# Patient Record
Sex: FEMALE | Race: WHITE | Employment: UNEMPLOYED | URBAN - METROPOLITAN AREA
[De-identification: names, ages, dates, MRNs, and addresses within clinical notes are randomized per-mention and may not be internally consistent; named-entity substitution may affect disease eponyms.]

---

## 2020-12-02 ENCOUNTER — OFFICE VISIT (OUTPATIENT)
Dept: INTERNAL MEDICINE CLINIC | Age: 20
End: 2020-12-02
Payer: COMMERCIAL

## 2020-12-02 VITALS
RESPIRATION RATE: 16 BRPM | WEIGHT: 195 LBS | HEART RATE: 80 BPM | BODY MASS INDEX: 38.28 KG/M2 | TEMPERATURE: 97.9 F | SYSTOLIC BLOOD PRESSURE: 106 MMHG | HEIGHT: 60 IN | OXYGEN SATURATION: 99 % | DIASTOLIC BLOOD PRESSURE: 66 MMHG

## 2020-12-02 DIAGNOSIS — F32.A ANXIETY AND DEPRESSION: ICD-10-CM

## 2020-12-02 DIAGNOSIS — F41.9 ANXIETY AND DEPRESSION: ICD-10-CM

## 2020-12-02 DIAGNOSIS — Z13.220 SCREENING CHOLESTEROL LEVEL: ICD-10-CM

## 2020-12-02 DIAGNOSIS — Z00.00 ANNUAL PHYSICAL EXAM: Primary | ICD-10-CM

## 2020-12-02 DIAGNOSIS — R63.5 WEIGHT GAIN: ICD-10-CM

## 2020-12-02 DIAGNOSIS — E66.01 SEVERE OBESITY (HCC): ICD-10-CM

## 2020-12-02 PROCEDURE — 99204 OFFICE O/P NEW MOD 45 MIN: CPT | Performed by: INTERNAL MEDICINE

## 2020-12-02 RX ORDER — CITALOPRAM 10 MG/1
TABLET ORAL
Qty: 44 TAB | Refills: 0 | Status: SHIPPED | OUTPATIENT
Start: 2020-12-02 | End: 2021-01-05

## 2020-12-02 NOTE — PROGRESS NOTES
1. Severe obesity ([address]  the patient I had approximately 10-minute conversation regarding her weight. She admits she has gained quite a bit of weight ever since she had her child. 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 She notes that she does only eat a meal at night but she admits to huge meal with a lot of carbs. I have recommended the ketogenic diet can and I have explained to her her goal should be no more than 50 g of carbs per day. I also explained the significance of carbs from fiber and carbs from processed sugar. She is also going to look this up so she can get more information as well. 2. Annual physical exam  normal physical exam except for obesity  - TSH 3RD GENERATION; Future  - LIPID PANEL; Future    3. Anxiety and depression  we a long discussion regarding her anxiety and intermittent depression. This is been going on for a while and at first she thought it was ADHD but given the stressors at home and her problems with sleeping I suspect much of this is underlying depression anxiety. She has no suicidal ideation or homicidal ideation and interestingly enough reports that 2 family members are on Celexa. We will go ahead and start Celexa and have her follow-up in 4 weeks we will do 10 mg daily for 14 days and then increase to 2 tabs daily. She will then f/u in 4 weeks to gauge response.  - citalopram (CELEXA) 10 mg tablet; 1 tab daily for 14 days then increase to 2 tabs daily  Dispense: 44 Tab; Refill: 0    4. Weight gain  a little bit of thinning hair was noted. We will check a TSH  - TSH 3RD GENERATION; Future    5. Screening cholesterol level  echo screening lipid panel  - LIPID PANEL; Future      Chief Complaint   Patient presents with   [address] [place] [address] patient        HPI   This is a very pleasant 41-year-old female who presents to [facility] care. She has a history of ADHD which was diagnosed in childhood though she grew out of it.   She did not tolerate Adderall and was briefly on Concerta. She has not been on anything for ADHD since then. Over the past several months she has noticed that she is have been having a hard time concentrating trading although she reports her anxiety level is increased and dramatically. She reports things have been stressful at home especially with the baby and notes that her  is a first-line responder and she has been worried about him regarding Covid. It so bad that her wedding was actually canceled due to Covid. She has no chest pain palpitations shortness of breath she does not actively volunteer that she feels depressed but admits that her anxiety is bugging her. She also reports she is not getting enough sleep at night and has frequently awakening episodes. She reports she has also gained at least 45 pounds over the past few months and admits it is due to eating too much although she is restricted recently her meals to 1 meal a day and that is at night. She has occasional swelling in her feet that goes away when she elevates them as well. She has no nausea vomiting diarrhea and is moving her bowels well. Her menstrual cycle has returned to normal.  She gets her OB/GYN care from Dr. [facility]. Patient Active Problem List   Diagnosis Code    Severe obesity ([facility] E66.01        No current outpatient medications on file prior to visit. No current facility-administered medications on file prior to visit. ROS  - GEN: + weight gain, no fevers or chills  - HEENT: no vision changes, no tinnitus, no sore throat  - CV: no cp, palpitations + trace pedal edema  - RESP: no sob, cough  - ABD: no n/v/d, no blood in stool  - : no dysuria or changes in freq.   - SKIN: no rashes, ulcers  - Neuro: no resting tremors, parasthesia in extremities, no headaches  - MS: No weakness in extremities, no gait abnormalities  - Psych: + for depression and anxiety      Visit Vitals  /66 (BP 1 Location: Left arm, BP Patient Position: Sitting)   Pulse 80   Temp 97.9 °F (36.6 °C) (Temporal)   Resp 16   Ht 5' (1.524 m)   Wt 195 lb (88.5 kg)   SpO2 99%   BMI 38.08 kg/m²           Physical Exam  Constitutional:       Appearance: Normal appearance. overweight. NAD and pleasant  HENT:      Head: Normocephalic. Nose: Nose normal.      Mouth/Throat:      Mouth: Mucous membranes are moist. Throat not inflammed     Thyroid normal with no masses  Eyes:      Extraocular Movements: Extraocular movements intact. Conjunctiva/sclera: Conjunctivae normal. Sclera anicteric     Pupils: Pupils are equal, round, and reactive to light. Cardiovascular:      Rate and Rhythm: Normal rate and regular rhythm. Pulses: Normal pulses. Pulmonary:      Effort: No respiratory distress. Breath sounds: CTAB and No stridor. No rhonchi. Abdominal:      General: There is no distension. NT, ND  Neurological:      Mental Status: patient is alert and oriented times 3.  No resting tremor, normal gait diminished patellar reflexes     Cranial Nerves: cranial nerves grossly intact  Muskuloskeletal     Full ROM in extremities     Normal gait  Skin     Dry without lesions on examined areas, warm to the touch       Deferred  Psychiatry     Calm, normal affect, interacting normally

## 2020-12-02 NOTE — PROGRESS NOTES
Patient in the office to est care    1. Have you been to the ER, urgent care clinic since your last visit? Hospitalized since your last visit? No    2. Have you seen or consulted any other health care providers outside of the [address] [place] [address] since your last visit? Include any pap smears or colon screening.  No

## 2020-12-30 ENCOUNTER — TELEPHONE (OUTPATIENT)
Dept: INTERNAL MEDICINE CLINIC | Age: 20
End: 2020-12-30

## 2021-01-05 ENCOUNTER — VIRTUAL VISIT (OUTPATIENT)
Dept: INTERNAL MEDICINE CLINIC | Age: 21
End: 2021-01-05
Payer: COMMERCIAL

## 2021-01-05 DIAGNOSIS — F32.A ANXIETY AND DEPRESSION: Primary | ICD-10-CM

## 2021-01-05 DIAGNOSIS — F41.9 ANXIETY AND DEPRESSION: Primary | ICD-10-CM

## 2021-01-05 PROCEDURE — 99442 PR PHYS/QHP TELEPHONE EVALUATION 11-20 MIN: CPT | Performed by: INTERNAL MEDICINE

## 2021-01-05 NOTE — PROGRESS NOTES
I was at my office in [place], [place] while conducting this encounter. The patient is at home. Consent:  Patient and/or HIS/HER healthcare decision maker is aware that this patient-initiated Telehealth encounter is a billable service, with coverage as determined by patient's insurance carrier. Patient is aware that He/She may receive a bill and has provided verbal consent to proceed: Consent has been obtained within past 12 months of the date of this encounter. This virtual visit was conducted via Telephone    1. Anxiety and depression  Patient reports her anxiety and depression is better but she does not like the way the Celexa makes her feel. Because she is only on 10 mg and can rapidly wean her off. She is to take 5 mg daily for the next 4 days and then discontinue. She is to call my office if she experiences any issues. Regarding further treatment the plan at this time is to see how she does over the next 2 to 4 weeks. We may consider alternative therapy such as Cymbalta or Lexapro       Chief Complaint   Patient presents with    Depression    Anxiety     needs medication changed        HPI   This is a very pleasant 23-year-old female who has been on Celexa for quite some time now. She reports she just does not like the way it feels. She describes brain fog and just not being able to think. She was having some issues at home which made her want to go on Celexa which have now pretty much resolved. She has no HI or SI. She reports she does not feel depressed at all or even anxious for that matter. She would like to come off the medication. No current outpatient medications on file prior to visit. No current facility-administered medications on file prior to visit.          Patient Active Problem List   Diagnosis Code    Severe obesity ([facility] [address] E66.01             Total Time Spent on this Encounter: 12 minutes spent

## 2021-01-05 NOTE — PROGRESS NOTES
Had to stop her Celexa due to it making her very emotional and feeling unlike herself. Renuka Mckeon presents today for   Chief Complaint   Patient presents with    Depression    Anxiety     needs medication changed       Depression Screening:  3 most recent PHQ Screens 1/5/2021   Little interest or pleasure in doing things Not at all   Feeling down, depressed, irritable, or hopeless Not at all   Total Score PHQ 2 0       Learning Assessment:  Learning Assessment 12/2/2020   PRIMARY LEARNER Patient   PRIMARY LANGUAGE ENGLISH   LEARNER PREFERENCE PRIMARY VIDEOS   ANSWERED BY patient   RELATIONSHIP SELF       Health Maintenance reviewed and discussed and ordered per Provider. Health Maintenance Due   Topic Date Due    Pneumococcal 0-64 years (1 of 1 - PPSV23) 06/16/2004    HPV Age 9Y-34Y (1 - 2-dose series) 06/16/2009    PAP AKA CERVICAL CYTOLOGY  06/16/2019    Flu Vaccine (1) 09/01/2020   . Coordination of Care:  1. Have you been to the ER, urgent care clinic since your last visit? Hospitalized since your last visit? no    2. Have you seen or consulted any other health care providers outside of the [address] [place] [address] since your last visit? Include any pap smears or colon screening.  no

## 2021-10-18 ENCOUNTER — OFFICE VISIT (OUTPATIENT)
Dept: INTERNAL MEDICINE CLINIC | Age: 21
End: 2021-10-18
Payer: COMMERCIAL

## 2021-10-18 VITALS
OXYGEN SATURATION: 98 % | TEMPERATURE: 97.3 F | SYSTOLIC BLOOD PRESSURE: 107 MMHG | DIASTOLIC BLOOD PRESSURE: 73 MMHG | WEIGHT: 182 LBS | RESPIRATION RATE: 16 BRPM | HEIGHT: 60 IN | HEART RATE: 68 BPM | BODY MASS INDEX: 35.73 KG/M2

## 2021-10-18 DIAGNOSIS — F41.9 ANXIETY AND DEPRESSION: Primary | ICD-10-CM

## 2021-10-18 DIAGNOSIS — F32.A ANXIETY AND DEPRESSION: Primary | ICD-10-CM

## 2021-10-18 PROCEDURE — 99214 OFFICE O/P EST MOD 30 MIN: CPT | Performed by: INTERNAL MEDICINE

## 2021-10-18 RX ORDER — LORATADINE 10 MG/1
10 TABLET ORAL
COMMUNITY
End: 2022-04-04

## 2021-10-18 RX ORDER — PAROXETINE 10 MG/1
10 TABLET, FILM COATED ORAL DAILY
Qty: 46 TABLET | Refills: 0 | Status: SHIPPED | OUTPATIENT
Start: 2021-10-18 | End: 2021-11-08 | Stop reason: SDUPTHER

## 2021-10-18 NOTE — PROGRESS NOTES
1. Anxiety and depression  This is an acute issue. Based on her presentation I believe depression is driving much of her anxiety. Given that she has been having problems with her  and that all of these feelings started after this we are going to try Paxil 10 mg. We will then increase to 20 mg after 2 weeks. The patient has been advised to stop taking it immediately should she develop any suicidal or homicidal thoughts. I am also going to check a TSH to make sure that her thyroid axis is not pitched off  - PARoxetine (PAXIL) 10 mg tablet; Take 1 Tablet by mouth daily. One tab daily for two weeks. Then 2 tabs daily thereafter. Dispense: 46 Tablet; Refill: 0  - TSH 3RD GENERATION      Chief Complaint   Patient presents with    Depression    Anxiety        HPI   This is a very pleasant 17-year-old female with a history of anxiety and depression in the past.  When she and I last spoke she felt like she did not need any antidepressants and she sought advice on how to wean herself off. She reports she has done really well up until a couple months ago. Her  and she started having problems. She describes it as a rough patch. She denies any homicidal ideation or suicidal ideation but reports she will get very anxious for no reason. Sometimes when she will start talking to her friend she will just start crying out of the blue. She reports a small amount of fatigue. Her weight has been stable. She does not have panic attacks and that she has chest pain or chest tightness but she reports just anxiety that can hit her at random. She has no other complaints today  Patient Active Problem List   Diagnosis Code    [facility] ([facility] E66.01        Current Outpatient Medications on File Prior to Visit   Medication Sig Dispense Refill    loratadine (Claritin) 10 mg tablet Take 10 mg by mouth daily as needed for Allergies. No current facility-administered medications on file prior to visit. ROS  - GEN: no weight gain/loss, no fevers or chills    - CV: no cp, palpitations or edema  - RESP: no sob, cough    - Psych: + for depression + anxiety      Visit Vitals  /73 (BP 1 Location: Left arm, BP Patient Position: Sitting, BP Cuff Size: Large adult)   Pulse 68   Temp 97.3 °F (36.3 °C) (Temporal)   Resp 16   Ht 5' (1.524 m)   Wt 182 lb (82.6 kg)   SpO2 98%   BMI 35.54 kg/m²           Physical Exam  Constitutional:       Appearance: Normal appearance. overweight. NAD and pleasant  HENT:      Head: Normocephalic. Nose: Nose normal.      Mouth/Throat:      Mouth: Mucous membranes are moist. Throat not inflammed     No thyromegally  Eyes:      Extraocular Movements: Extraocular movements intact. Conjunctiva/sclera: Conjunctivae normal. Sclera anicteric  . Cardiovascular:      Rate and Rhythm: Normal rate and regular rhythm. Pulses: Normal pulses. Pulmonary:      Effort: No respiratory distress. Breath sounds: CTAB and No stridor. No rhonchi.    Psychiatry     Calm, normal affect, interacting normally

## 2021-10-18 NOTE — PROGRESS NOTES
Irlanda Monterroso presents today for   Chief Complaint   Patient presents with    Depression    Anxiety     Patient says that she doesn't know if it is her anxiety fueling her depression or the opposite. She says she has chewed her nails off, she just woke up one day feeling \"Blah\" and hasn't been able to get herself out of \"this funk\" and was just upset and her insides felt shaky. Is someone accompanying this pt? NO  Is the patient using any DME equipment during OV? NO    Depression Screening:  3 most recent PHQ Screens 10/18/2021   Little interest or pleasure in doing things Not at all   Feeling down, depressed, irritable, or hopeless Several days   Total Score PHQ 2 1       Learning Assessment:  Learning Assessment 12/2/2020   PRIMARY LEARNER Patient   PRIMARY LANGUAGE ENGLISH   LEARNER PREFERENCE PRIMARY VIDEOS   ANSWERED BY patient   RELATIONSHIP SELF         Health Maintenance reviewed and discussed and ordered per Provider. Health Maintenance Due   Topic Date Due    Hepatitis C Screening  Never done    Pneumococcal 0-64 years (1 of 2 - PPSV23) Never done    HPV Age 9Y-34Y (1 - 2-dose series) Never done    COVID-19 Vaccine (1) Never done    Pap Smear  Never done    Flu Vaccine (1) 09/01/2021   . Coordination of Care:  1. \"Have you been to the ER, urgent care clinic since your last visit? Hospitalized since your last visit? \" No    2. \"Have you seen or consulted any other health care providers outside of the [address] [place] [address] since your last visit? \" No

## 2021-11-08 ENCOUNTER — VIRTUAL VISIT (OUTPATIENT)
Dept: INTERNAL MEDICINE CLINIC | Age: 21
End: 2021-11-08
Payer: COMMERCIAL

## 2021-11-08 DIAGNOSIS — F32.A ANXIETY AND DEPRESSION: Primary | ICD-10-CM

## 2021-11-08 DIAGNOSIS — Z72.0 TOBACCO ABUSE: ICD-10-CM

## 2021-11-08 DIAGNOSIS — F41.9 ANXIETY AND DEPRESSION: Primary | ICD-10-CM

## 2021-11-08 DIAGNOSIS — E66.09 CLASS 2 OBESITY DUE TO EXCESS CALORIES WITHOUT SERIOUS COMORBIDITY WITH BODY MASS INDEX (BMI) OF 35.0 TO 35.9 IN ADULT: ICD-10-CM

## 2021-11-08 PROCEDURE — 99443 PR PHYS/QHP TELEPHONE EVALUATION 21-30 MIN: CPT | Performed by: INTERNAL MEDICINE

## 2021-11-08 RX ORDER — PAROXETINE 10 MG/1
20 TABLET, FILM COATED ORAL DAILY
Qty: 180 TABLET | Refills: 1 | Status: SHIPPED | OUTPATIENT
Start: 2021-11-08 | End: 2022-01-10 | Stop reason: DRUGHIGH

## 2021-11-08 NOTE — PROGRESS NOTES
Patient states she feels great. Gertrudis Cohen presents today for   Chief Complaint   Patient presents with    Depression     follow up       Depression Screening:  3 most recent PHQ Screens 10/18/2021   Little interest or pleasure in doing things Not at all   Feeling down, depressed, irritable, or hopeless Several days   Total Score PHQ 2 1       Learning Assessment:  Learning Assessment 12/2/2020   PRIMARY LEARNER Patient   PRIMARY LANGUAGE ENGLISH   LEARNER PREFERENCE PRIMARY VIDEOS   ANSWERED BY patient   RELATIONSHIP SELF       Health Maintenance reviewed and discussed and ordered per Provider. Health Maintenance Due   Topic Date Due    Hepatitis C Screening  Never done    Pneumococcal 0-64 years (1 of 2 - PPSV23) Never done    HPV Age 9Y-34Y (1 - 2-dose series) Never done    COVID-19 Vaccine (1) Never done    Pap Smear  Never done    Flu Vaccine (1) 09/01/2021   . Coordination of Care:  1. \"Have you been to the ER, urgent care clinic since your last visit? Hospitalized since your last visit? \" No    2. \"Have you seen or consulted any other health care providers outside of the [address] [place] [address] since your last visit? \" No

## 2021-11-08 NOTE — PROGRESS NOTES
Discharge Summary - Kavya Aragon 76 y o  male MRN: [id number]    Unit/Bed#: OR [place] Encounter: [id number]      Pre-Operative Diagnosis: Pre-Op Diagnosis Codes:     * Lipoma [D17 9]    Post-Operative Diagnosis: Post-Op Diagnosis Codes:     * Lipoma [D17 9]    Procedures Performed:  Procedure(s):  EXCISION LIPOMA SHOULDER  EXPLORATION right shoulder, biopsy and draiange of cyst    Surgeon: Lev Morrow MD    See H & P for full details of admission and Operative Note for full details of operations performed  Patient was seen and examined prior to discharge  Provisions for Follow-Up Care:  See After Visit Summary for information related to follow-up care and home orders  Disposition: Home, in stable condition  Planned Readmission: No    Discharge Medications:  See after visit summary for reconciled discharge medications provided to patient and family  Post Operative instructions: Reviewed with patient and/or family  Some portions of this record may have been generated with voice recognition software  There may be translation, syntax,  or grammatical errors  Occasional wrong word or "sound-a-like" substitutions may have occurred due to the inherent limitations of the voice recognition software  Read the chart carefully and recognize, using context, where substitutions may have occurred  If you have any questions, please contact the dictating provider for clarification or correction, as needed  This encounter has been coded by non certified Coder      Signature:   Lev Morrow MD  Date: 12/12/2019 Time: 12:12 PM I was at my office in [place], [place] while conducting this encounter. The patient is at home. Consent:  Patient and/or HIS/HER healthcare decision maker is aware that this patient-initiated Telehealth encounter is a billable service, with coverage as determined by patient's insurance carrier. Patient is aware that He/She may receive a bill and has provided verbal consent to proceed: Consent has been obtained within past 12 months of the date of this encounter. This virtual visit was conducted via Telephone    1. Anxiety and depression  Mrs. Vanessa Sommers is doing very well with Paxil 20 mg daily. She also reports it is curbed her appetite. I believe we have found the right dose. I am going to renew her Paxil for 6 months  - PARoxetine (PAXIL) 10 mg tablet; Take 2 Tablets by mouth daily. One tab daily for two weeks. Then 2 tabs daily thereafter. Dispense: 180 Tablet; Refill: 1    2. Tobacco abuse  I am very proud of Mrs. Vanessa Sommers for quitting smoking cigarettes. She has started using a nicotine replacement device or vape. I did explain to her that research on this does not show that it is innocuous. I have recommended that she use this as a bridge to complete tobacco cessation    3. Class 2 obesity due to excess calories without serious comorbidity with body mass index (BMI) of 35.0 to 35.9 in adult  She has promised me that she is going to start looking at the labels and cutting down on her carbs. Chief Complaint   Patient presents with    Depression     follow up        HPI   This is a delightful 17-year-old female with a history of morbid obesity tobacco use and recently anxiety and depression. She had been dealing with a lot of things at home and reported that her anxiety was just \"off the chain\". She never reported any SI or HI. We 1 up on her Paxil dose to 20 mg and she reports her symptoms have virtually resolved.   She does not report any ill side effects although she is noted she does not eat as much in the morning. She has no headache or vision changes she has no nausea or vomiting. She also reports that she quit smoking cigarettes and switched over to a vape. She reports she is already feeling better. She has not noticed any weight loss but admits she has not been doing that good of a job. She has promised me though she is going to redouble her efforts and start looking at all the labels especially the number of carbohydrates and every serving she eats. She otherwise reports she is doing great and is very happy that the Paxil is working  Current Outpatient Medications on File Prior to Visit   Medication Sig Dispense Refill    prenatal 08/FJQA fum/folic/dha (PRENATAL-1 PO) Take  by mouth.  loratadine (Claritin) 10 mg tablet Take 10 mg by mouth daily as needed for Allergies. No current facility-administered medications on file prior to visit.         Patient Active Problem List   Diagnosis Code    [facility] ([facility] E66.01             Total Time Spent on this Encounter: total time spent was approx 21 minutes

## 2022-01-10 ENCOUNTER — TELEPHONE (OUTPATIENT)
Dept: INTERNAL MEDICINE CLINIC | Age: 22
End: 2022-01-10

## 2022-01-10 DIAGNOSIS — F41.9 ANXIETY AND DEPRESSION: Primary | ICD-10-CM

## 2022-01-10 DIAGNOSIS — F32.A ANXIETY AND DEPRESSION: Primary | ICD-10-CM

## 2022-01-10 RX ORDER — PAROXETINE 30 MG/1
30 TABLET, FILM COATED ORAL DAILY
Qty: 90 TABLET | Refills: 1 | Status: SHIPPED | OUTPATIENT
Start: 2022-01-10 | End: 2022-04-04

## 2022-01-10 RX ORDER — PAROXETINE 30 MG/1
30 TABLET, FILM COATED ORAL DAILY
COMMUNITY
End: 2022-01-10 | Stop reason: SDUPTHER

## 2022-01-10 NOTE — TELEPHONE ENCOUNTER
Patient stated the RX Paxil is not helping her like it used to. She feels she has gotten used to this dosage.   [phone]

## 2022-01-19 DIAGNOSIS — F32.A ANXIETY AND DEPRESSION: ICD-10-CM

## 2022-01-19 DIAGNOSIS — F41.9 ANXIETY AND DEPRESSION: ICD-10-CM

## 2022-01-19 RX ORDER — PAROXETINE 30 MG/1
30 TABLET, FILM COATED ORAL DAILY
Qty: 90 TABLET | Refills: 1 | OUTPATIENT
Start: 2022-01-19

## 2022-02-24 ENCOUNTER — NURSE TRIAGE (OUTPATIENT)
Dept: OTHER | Facility: CLINIC | Age: 22
End: 2022-02-24

## 2022-02-24 NOTE — TELEPHONE ENCOUNTER
Received call from [facility] with Red Flag Complaint; Patient with Red Flag Complaint requesting to establish care with [facility]. Subjective: Caller states \"I started over a year ago trying to figure out if my anxiety is fueling my depression or my depression fueling my anxiety. I have tried a few different medications and I feel like I am in a rut\"     Current Symptoms: \"I feel like I am in a rut\", stress level     Onset: 1 week ago; unchanged    Associated Symptoms: NA    Pain Severity: denies    Temperature: denies    What has been tried: she is on Paxil     LMP: ended 2 days ago Pregnant: no     Recommended disposition: See PCP within 3 Days    Care advice provided, patient verbalizes understanding; denies any other questions or concerns; instructed to call back for any new or worsening symptoms. Patient/Caller agrees with recommended disposition; writer provided warm transfer to [employer] at [facility] for appointment scheduling    Attention Provider: Thank you for allowing me to participate in the care of your patient. The patient was connected to triage in response to information provided to the E[facility]. Please do not respond through this encounter as the response is not directed to a shared pool.         Reason for Disposition   Symptoms interfere with work or school    Protocols used: DEPRESSION-ADULT-[place]

## 2022-03-19 PROBLEM — E66.01 SEVERE OBESITY (HCC): Status: ACTIVE | Noted: 2020-12-02

## 2022-04-04 ENCOUNTER — HOSPITAL ENCOUNTER (EMERGENCY)
Age: 22
Discharge: HOME | End: 2022-04-04
Attending: EMERGENCY MEDICINE
Payer: COMMERCIAL

## 2022-04-04 ENCOUNTER — APPOINTMENT (OUTPATIENT)
Dept: GENERAL RADIOLOGY | Age: 22
End: 2022-04-04
Attending: EMERGENCY MEDICINE
Payer: COMMERCIAL

## 2022-04-04 VITALS
RESPIRATION RATE: 18 BRPM | HEIGHT: 60 IN | SYSTOLIC BLOOD PRESSURE: 125 MMHG | BODY MASS INDEX: 37.3 KG/M2 | OXYGEN SATURATION: 99 % | HEART RATE: 78 BPM | WEIGHT: 190 LBS | DIASTOLIC BLOOD PRESSURE: 74 MMHG | TEMPERATURE: 98.5 F

## 2022-04-04 DIAGNOSIS — S93.602A SPRAIN OF LEFT FOOT, INITIAL ENCOUNTER: Primary | ICD-10-CM

## 2022-04-04 PROCEDURE — 74011250637 HC RX REV CODE- 250/637: Performed by: EMERGENCY MEDICINE

## 2022-04-04 PROCEDURE — 73630 X-RAY EXAM OF FOOT: CPT

## 2022-04-04 PROCEDURE — 99283 EMERGENCY DEPT VISIT LOW MDM: CPT

## 2022-04-04 RX ORDER — KETOROLAC TROMETHAMINE 10 MG/1
10 TABLET, FILM COATED ORAL
Qty: 15 TABLET | Refills: 0 | Status: SHIPPED | OUTPATIENT
Start: 2022-04-04 | End: 2022-09-22

## 2022-04-04 RX ORDER — KETOROLAC TROMETHAMINE 10 MG/1
10 TABLET, FILM COATED ORAL ONCE
Status: COMPLETED | OUTPATIENT
Start: 2022-04-04 | End: 2022-04-04

## 2022-04-04 RX ORDER — SWAB
1 SWAB, NON-MEDICATED MISCELLANEOUS DAILY
COMMUNITY
End: 2022-09-22

## 2022-04-04 RX ADMIN — KETOROLAC TROMETHAMINE 10 MG: 10 TABLET, FILM COATED ORAL at 15:43

## 2022-04-13 NOTE — ED PROVIDER NOTES
EMERGENCY DEPARTMENT HISTORY AND PHYSICAL EXAM      Date: 4/4/2022  Patient Name: Chelsey Ramirez    History of Presenting Illness     Chief Complaint   Patient presents with    Foot Injury       History Provided By: Patient    HPI: Chelsey Ramirez, 19 y.o. female with a past medical history significant Depression presents to the ED with cc of left foot injury. Patient states that she stepped wrong walking down steps last night at home. Complains of pain and swelling of left ankle pain is sharp and localized. It is worse with ambulation. There are no other complaints, changes, or physical findings at this time. PCP: None    No current facility-administered medications on file prior to encounter. Current Outpatient Medications on File Prior to Encounter   Medication Sig Dispense Refill    prenatal vit-iron fumarate-fa (PRENATAL PLUS with IRON) 28 mg iron- 800 mcg tab Take 1 Tablet by mouth daily. Past History     Past Medical History:  Past Medical History:   Diagnosis Date    Depression        Past Surgical History:  Past Surgical History:   Procedure Laterality Date    HX GYN         Family History:  History reviewed. No pertinent family history. Social History:  Social History     Tobacco Use    Smoking status: Former Smoker     Packs/day: 0.25     Years: 10.00     Pack years: 2.50    Smokeless tobacco: Current User   Substance Use Topics    Alcohol use: Not Currently    Drug use: Not Currently       Allergies:  No Known Allergies      Review of Systems     Review of Systems   All other systems reviewed and are negative. Physical Exam     Physical Exam  Vitals and nursing note reviewed. Constitutional:       General: She is not in acute distress. Appearance: She is well-developed. She is not diaphoretic. Comments: Appears in no acute distress   HENT:      Head: Normocephalic and atraumatic. Jaw: No trismus.       Right Ear: External ear normal. No swelling or tenderness. Tympanic membrane is not perforated, erythematous or bulging. Left Ear: External ear normal. No swelling or tenderness. Tympanic membrane is not perforated, erythematous or bulging. Nose: Nose normal. No mucosal edema or rhinorrhea. Right Sinus: No maxillary sinus tenderness or frontal sinus tenderness. Left Sinus: No maxillary sinus tenderness or frontal sinus tenderness. Mouth/Throat:      Mouth: No oral lesions. Dentition: No dental abscesses. Pharynx: Uvula midline. No oropharyngeal exudate, posterior oropharyngeal erythema or uvula swelling. Tonsils: No tonsillar abscesses. Eyes:      General: No scleral icterus. Right eye: No discharge. Left eye: No discharge. Conjunctiva/sclera: Conjunctivae normal.   Cardiovascular:      Rate and Rhythm: Normal rate and regular rhythm. Heart sounds: Normal heart sounds. No murmur heard. No friction rub. No gallop. Pulmonary:      Effort: Pulmonary effort is normal. No tachypnea, accessory muscle usage or respiratory distress. Breath sounds: Normal breath sounds. No decreased breath sounds, wheezing, rhonchi or rales. Abdominal:      Palpations: Abdomen is soft. Musculoskeletal:         General: Swelling and tenderness present. Normal range of motion. Cervical back: Normal range of motion and neck supple. Comments: There is minimal swelling of the left dorsal lateral foot. There is significant tenderness to palpation over fourth and fifth metatarsals  Deformity otherwise neurovascularly intact. The ankle is intact. There is fair weight support. Lymphadenopathy:      Cervical: No cervical adenopathy. Skin:     General: Skin is warm and dry. Findings: No erythema or rash. Neurological:      Mental Status: She is alert and oriented to person, place, and time.    Psychiatric:         Judgment: Judgment normal.         Lab and Diagnostic Study Results Labs -   No results found for this or any previous visit (from the past 12 hour(s)). Radiologic Studies -   @lastxrresult@  CT Results  (Last 48 hours)    None        CXR Results  (Last 48 hours)    None            Medical Decision Making   - I am the first provider for this patient. - I reviewed the vital signs, available nursing notes, past medical history, past surgical history, family history and social history. - Initial assessment performed. The patients presenting problems have been discussed, and they are in agreement with the care plan formulated and outlined with them. I have encouraged them to ask questions as they arise throughout their visit. Vital Signs-Reviewed the patient's vital signs. No data found. Records Reviewed: Nursing Notes and Old Medical Records    The patient presents with left foot injury. ED Course:          Provider Notes (Medical Decision Making):          Procedures   Medical Decision Makingedical Decision Making  Performed by: Bird Hurst MD  PROCEDURES:  Procedures       Disposition   Disposition: Condition stable  DC- Adult Discharges: All of the diagnostic tests were reviewed and questions answered. Diagnosis, care plan and treatment options were discussed. The patient understands the instructions and will follow up as directed. The patients results have been reviewed with them. They have been counseled regarding their diagnosis. The patient verbally convey understanding and agreement of the signs, symptoms, diagnosis, treatment and prognosis and additionally agrees to follow up as recommended with their PCP in 24 - 48 hours. They also agree with the care-plan and convey that all of their questions have been answered.   I have also put together some discharge instructions for them that include: 1) educational information regarding their diagnosis, 2) how to care for their diagnosis at home, as well a 3) list of reasons why they would want to return to the ED prior to their follow-up appointment, should their condition change. Diagnosis:   1. Sprain of left foot, initial encounter          Disposition:     Follow-up Information     Follow up With Specialties Details Why Contact Info    Lorenzo Matamoros MD Family Medicine In 3 days  [id number] [facility]      [facility], [address] [place] [address] In 1 day  [address]  [phone]            Discharge Medication List as of 4/4/2022  3:44 PM      START taking these medications    Details   ketorolac (TORADOL) 10 mg tablet Take 1 Tablet by mouth every eight (8) hours as needed for Pain., Normal, Disp-15 Tablet, R-0         CONTINUE these medications which have NOT CHANGED    Details   prenatal vit-iron fumarate-fa (PRENATAL PLUS with IRON) 28 mg iron- 800 mcg tab Take 1 Tablet by mouth daily. , Historical Med             DISCHARGE PLAN:  1. Cannot display discharge medications since this patient is not currently admitted. 2.   Follow-up Information     Follow up With Specialties Details Why Contact Info    Lorenzo Matamoros MD Family Medicine In 3 days  [id number] [facility]      [place], [address] [place] [address] In 1 day  [address]  [phone]          3. Return to ED if worse   4. Discharge Medication List as of 4/4/2022  3:44 PM      START taking these medications    Details   ketorolac (TORADOL) 10 mg tablet Take 1 Tablet by mouth every eight (8) hours as needed for Pain., Normal, Disp-15 Tablet, R-0         CONTINUE these medications which have NOT CHANGED    Details   prenatal vit-iron fumarate-fa (PRENATAL PLUS with IRON) 28 mg iron- 800 mcg tab Take 1 Tablet by mouth daily. , Historical Med               Diagnosis     Clinical Impression:   1.  Sprain of left foot, initial encounter        Attestations:    Tequila Menon MD    Please note that this dictation was completed with Dragon, the computer voice recognition software. Quite often unanticipated grammatical, syntax, homophones, and other interpretive errors are inadvertently transcribed by the computer software. Please disregard these errors. Please excuse any errors that have escaped final proofreading. Thank you.

## 2022-09-22 ENCOUNTER — HOSPITAL ENCOUNTER (EMERGENCY)
Age: 22
Discharge: HOME | End: 2022-09-22
Attending: INTERNAL MEDICINE | Admitting: INTERNAL MEDICINE
Payer: COMMERCIAL

## 2022-09-22 VITALS
BODY MASS INDEX: 37.3 KG/M2 | WEIGHT: 190 LBS | OXYGEN SATURATION: 99 % | HEART RATE: 91 BPM | HEIGHT: 60 IN | DIASTOLIC BLOOD PRESSURE: 81 MMHG | TEMPERATURE: 97.5 F | SYSTOLIC BLOOD PRESSURE: 111 MMHG | RESPIRATION RATE: 18 BRPM

## 2022-09-22 DIAGNOSIS — A60.1 HERPES SIMPLEX INFECTION OF PERIANAL SKIN: Primary | ICD-10-CM

## 2022-09-22 LAB
APPEARANCE UR: ABNORMAL
BACTERIA URNS QL MICRO: ABNORMAL /HPF
BILIRUB UR QL: ABNORMAL
COLOR UR: ABNORMAL
EPITH CASTS URNS QL MICRO: ABNORMAL /LPF (ref 0–20)
GLUCOSE UR STRIP.AUTO-MCNC: NEGATIVE MG/DL
HCG UR QL: NEGATIVE
HGB UR QL STRIP: ABNORMAL
KETONES UR QL STRIP.AUTO: 40 MG/DL
LEUKOCYTE ESTERASE UR QL STRIP.AUTO: NEGATIVE
MUCOUS THREADS URNS QL MICRO: ABNORMAL /LPF
NITRITE UR QL STRIP.AUTO: NEGATIVE
PH UR STRIP: 6 [PH] (ref 5–8)
PROT UR STRIP-MCNC: 30 MG/DL
RBC #/AREA URNS HPF: ABNORMAL /HPF (ref 0–2)
SP GR UR REFRACTOMETRY: >1.03 (ref 1–1.03)
UROBILINOGEN UR QL STRIP.AUTO: 1 EU/DL (ref 0.2–1)
WBC URNS QL MICRO: ABNORMAL /HPF (ref 0–4)

## 2022-09-22 PROCEDURE — 87529 HSV DNA AMP PROBE: CPT

## 2022-09-22 PROCEDURE — 81001 URINALYSIS AUTO W/SCOPE: CPT

## 2022-09-22 PROCEDURE — 81025 URINE PREGNANCY TEST: CPT

## 2022-09-22 PROCEDURE — 74011250637 HC RX REV CODE- 250/637: Performed by: INTERNAL MEDICINE

## 2022-09-22 PROCEDURE — 99283 EMERGENCY DEPT VISIT LOW MDM: CPT | Performed by: INTERNAL MEDICINE

## 2022-09-22 RX ORDER — IBUPROFEN 400 MG/1
800 TABLET ORAL
Status: COMPLETED | OUTPATIENT
Start: 2022-09-22 | End: 2022-09-22

## 2022-09-22 RX ORDER — ACYCLOVIR 400 MG/1
400 TABLET ORAL 3 TIMES DAILY
Qty: 30 TABLET | Refills: 0 | Status: SHIPPED | OUTPATIENT
Start: 2022-09-22 | End: 2022-10-02

## 2022-09-22 RX ADMIN — IBUPROFEN 800 MG: 400 TABLET, FILM COATED ORAL at 10:05

## 2022-09-22 NOTE — ED TRIAGE NOTES
Pt states she thought she had a hemorroid so she started using some preparation H, pt states she now has a rash/bumps in her private area.

## 2022-09-22 NOTE — ED PROVIDER NOTES
EMERGENCY DEPARTMENT HISTORY AND PHYSICAL EXAM      Date: 9/22/2022  Patient Name: Buddy Teran    History of Presenting Illness     Chief Complaint   Patient presents with    Anal Pain       History Provided By: Patient    HPI: Buddy Teran, 23 y.o. female with no significant medical history that states that her period started 2 d ago and now she has pain in the perianal area that she has had since Sunday and thought it was hemorrhoid. Also states to mild dysuria. .     There are no other complaints, changes, or physical findings at this time. PCP: None    No current facility-administered medications on file prior to encounter. Current Outpatient Medications on File Prior to Encounter   Medication Sig Dispense Refill    [DISCONTINUED] prenatal vit-iron fumarate-fa (PRENATAL PLUS with IRON) 28 mg iron- 800 mcg tab Take 1 Tablet by mouth daily. [DISCONTINUED] ketorolac (TORADOL) 10 mg tablet Take 1 Tablet by mouth every eight (8) hours as needed for Pain. 15 Tablet 0       Past History     Past Medical History:  Past Medical History:   Diagnosis Date    Depression        Past Surgical History:  Past Surgical History:   Procedure Laterality Date    HX GYN         Family History:  History reviewed. No pertinent family history. Social History:  Social History     Tobacco Use    Smoking status: Former     Packs/day: 0.25     Years: 10.00     Pack years: 2.50     Types: Cigarettes    Smokeless tobacco: Current    Tobacco comments:     [id number]     10   Substance Use Topics    Alcohol use: Not Currently    Drug use: Not Currently       Allergies:  No Known Allergies    Review of Systems   Review of Systems   Constitutional:  Negative for chills and fever. HENT:  Negative for sore throat. Respiratory:  Negative for cough and shortness of breath. Cardiovascular:  Negative for chest pain. Gastrointestinal:  Negative for abdominal pain, diarrhea, nausea and vomiting.    Genitourinary: Positive for dysuria and genital sores. Negative for frequency. Musculoskeletal:  Negative for arthralgias and myalgias. Skin:  Positive for rash. Psychiatric/Behavioral:  Negative for confusion. Physical Exam   Physical Exam  Vitals and nursing note reviewed. Constitutional:       Appearance: Normal appearance. She is obese. She is not ill-appearing. Eyes:      Extraocular Movements: Extraocular movements intact. Conjunctiva/sclera: Conjunctivae normal.   Pulmonary:      Effort: Pulmonary effort is normal.   Genitourinary:     Comments: HOLLI Gutierrez- Herpetic looking perianal lesions  Musculoskeletal:      Cervical back: Neck supple. Neurological:      Mental Status: She is alert and oriented to person, place, and time.        Lab and Diagnostic Study Results   Labs -     Recent Results (from the past 12 hour(s))   URINALYSIS W/ RFLX MICROSCOPIC    Collection Time: 09/22/22  8:10 AM   Result Value Ref Range    Color Dark Yellow      Appearance Cloudy      Specific gravity >1.030 (H) 1.005 - 1.030    pH (UA) 6.0 5.0 - 8.0      Protein 30 (A) Negative mg/dL    Glucose Negative Negative mg/dL    Ketone 40 (A) Negative mg/dL    Bilirubin Small (A) Negative      Blood Small (A) Negative      Urobilinogen 1.0 0.2 - 1.0 EU/dL    Nitrites Negative Negative      Leukocyte Esterase Negative Negative     HCG URINE, QL    Collection Time: 09/22/22  8:10 AM   Result Value Ref Range    HCG urine, QL Negative Negative     URINE MICROSCOPIC    Collection Time: 09/22/22  8:10 AM   Result Value Ref Range    WBC 5-10 0 - 4 /hpf    RBC 5-10 0 - 2 /hpf    Epithelial cells Moderate 0 - 20 /lpf    Bacteria 2+ (A) None /hpf    Mucus 2+ /lpf       Radiologic Studies -   @lastxrresult@  CT Results  (Last 48 hours)      None          CXR Results  (Last 48 hours)      None            Medical Decision Making and ED Course   Differential Diagnosis & Medical Decision Making Provider Note:   Herpes genitalis    - I am the first provider for this patient. I reviewed the vital signs, available nursing notes, past medical history, past surgical history, family history and social history. The patients presenting problems have been discussed, and they are in agreement with the care plan formulated and outlined with them. I have encouraged them to ask questions as they arise throughout their visit. Vital Signs-Reviewed the patient's vital signs. Patient Vitals for the past 12 hrs:   Temp Pulse Resp BP SpO2   09/22/22 0819 97.5 °F (36.4 °C) 91 18 111/81 99 %       ED Course: Refer to ObGyn; Dr Nahid Hagen     Procedures       Disposition   Disposition: Discharge    DISCHARGE PLAN:  1. There are no discharge medications for this patient. 2.   Follow-up Information       Follow up With Specialties Details Why Olegario Velazquez MD Gynecology, Obstetrics & Gynecology Schedule an appointment as soon as possible for a visit in 1 week  [place] [address] [address] [address] [phone]            3. Return to ED if worse   4. Current Discharge Medication List        START taking these medications    Details   acyclovir (ZOVIRAX) 400 mg tablet Take 1 Tablet by mouth three (3) times daily for 10 days. Qty: 30 Tablet, Refills: 0  Start date: 9/22/2022, End date: 10/2/2022               Diagnosis/Clinical Impression     Clinical Impression:   1. Herpes simplex infection of perianal skin        Attestations: Rosa M Yip MD, am the primary clinician of record. Please note that this dictation was completed with [employer], the computer voice recognition software. Quite often unanticipated grammatical, syntax, homophones, and other interpretive errors are inadvertently transcribed by the computer software. Please disregard these errors. Please excuse any errors that have escaped final proofreading. Thank you.

## 2022-09-25 LAB
HSV1 DNA SPEC QL NAA+PROBE: NEGATIVE
HSV2 DNA SPEC QL NAA+PROBE: POSITIVE
SPECIMEN SOURCE: ABNORMAL

## 2023-05-03 ENCOUNTER — HOSPITAL ENCOUNTER (EMERGENCY)
Age: 23
Discharge: HOME | End: 2023-05-03
Attending: FAMILY MEDICINE
Payer: COMMERCIAL

## 2023-05-03 VITALS
TEMPERATURE: 98.5 F | OXYGEN SATURATION: 100 % | BODY MASS INDEX: 35.34 KG/M2 | HEART RATE: 84 BPM | DIASTOLIC BLOOD PRESSURE: 84 MMHG | RESPIRATION RATE: 18 BRPM | SYSTOLIC BLOOD PRESSURE: 123 MMHG | WEIGHT: 180 LBS | HEIGHT: 60 IN

## 2023-05-03 DIAGNOSIS — R51.9 SINUS HEADACHE: ICD-10-CM

## 2023-05-03 DIAGNOSIS — J01.90 SUBACUTE SINUSITIS, UNSPECIFIED LOCATION: Primary | ICD-10-CM

## 2023-05-03 PROCEDURE — 96372 THER/PROPH/DIAG INJ SC/IM: CPT

## 2023-05-03 PROCEDURE — 6360000002 HC RX W HCPCS: Performed by: FAMILY MEDICINE

## 2023-05-03 PROCEDURE — 99284 EMERGENCY DEPT VISIT MOD MDM: CPT

## 2023-05-03 RX ORDER — LEVOFLOXACIN 500 MG/1
500 TABLET, FILM COATED ORAL DAILY
Qty: 10 TABLET | Refills: 0 | Status: SHIPPED | OUTPATIENT
Start: 2023-05-03 | End: 2023-05-10 | Stop reason: ALTCHOICE

## 2023-05-03 RX ORDER — KETOROLAC TROMETHAMINE 30 MG/ML
30 INJECTION, SOLUTION INTRAMUSCULAR; INTRAVENOUS
Status: COMPLETED | OUTPATIENT
Start: 2023-05-03 | End: 2023-05-03

## 2023-05-03 RX ADMIN — KETOROLAC TROMETHAMINE 30 MG: 30 INJECTION, SOLUTION INTRAMUSCULAR at 11:04

## 2023-05-03 ASSESSMENT — PAIN - FUNCTIONAL ASSESSMENT: PAIN_FUNCTIONAL_ASSESSMENT: 0-10

## 2023-05-03 ASSESSMENT — PAIN SCALES - GENERAL: PAINLEVEL_OUTOF10: 6

## 2023-05-03 ASSESSMENT — ENCOUNTER SYMPTOMS: BACK PAIN: 1

## 2023-05-03 NOTE — DISCHARGE INSTRUCTIONS
As we spoke, I have high suspicion this is more of a sinus issue but to completely rule out any major infectious process as she states that runs from her head down to your spine and your tailbone. I have to rule out other bad issues. However you have refused any blood work at this time. My recommendation is that you have some blood work as well as a CT of your head. However I will write you an antibiotic prescription for what sounds like sinusitis. I want you to follow-up with your primary care doctor.

## 2023-05-03 NOTE — ED TRIAGE NOTES
Pt states she started with a headache about a week ago and now she has pain in her neck that goes up into her head and down her spine   Pt states her ears feel clogged and she can hear a pulse in her head   Pt has been taking tylenol and ibuprofen with no relief   Pt denies any injury and denies any other symptoms

## 2023-05-03 NOTE — ED PROVIDER NOTES
this chart in the absence of a cardiologist.        RADIOLOGY:   Non-plain film images such as CT, Ultrasound and MRI are read by the radiologist. Plain radiographic images are visualized and preliminarily interpreted by the emergency physician with the below findings:        Interpretation per the Radiologist below, if available at the time of this note:    No orders to display         ED BEDSIDE ULTRASOUND:   Performed by ED Physician - none    LABS:  Labs Reviewed - No data to display    All other labs were within normal range or not returned as of this dictation. EMERGENCY DEPARTMENT COURSE and DIFFERENTIAL DIAGNOSIS/MDM:   Vitals:    Vitals:    05/03/23 1039   BP: (!) 166/84   Pulse: 84   Resp: 18   Temp: 98.5 °F (36.9 °C)   TempSrc: Oral   SpO2: 100%   Weight: 180 lb (81.6 kg)   Height: 5' (1.524 m)           Medical Decision Making  Low suspicion that this is any type of meningitis, as she has full range of motion in her neck without any assistance from me does not have any type of grimace. Although I do believe that there is a sinus infection going on for about 2-1/2 weeks. At this point I will give her some Toradol. I do believe that she will also need some antibiotic. We will give her some Levaquin. She states she cannot be pregnant      As I am walking by the patient's room. I hear her screaming into the phone. And crying. I go into the room she is on the phone with her . She states that her headache is not improved. And it still rating down her spine. I tell her that we need to work-up her low bit more including a possible lumbar puncture. She then called her  while I am in the room the discussed this. And I can hear him screaming at her. At 1 point she says yes or. After the hang up the phone she acknowledges that the relationship is not a good relationship I wonder if this is more of a stress headache.   She is refusing to do any blood work CT of her head and definitely

## 2023-05-10 ENCOUNTER — HOSPITAL ENCOUNTER (EMERGENCY)
Age: 23
Discharge: HOME | End: 2023-05-10
Attending: EMERGENCY MEDICINE
Payer: COMMERCIAL

## 2023-05-10 ENCOUNTER — APPOINTMENT (OUTPATIENT)
Age: 23
End: 2023-05-10
Payer: COMMERCIAL

## 2023-05-10 VITALS
TEMPERATURE: 98.2 F | WEIGHT: 181 LBS | HEART RATE: 74 BPM | BODY MASS INDEX: 35.53 KG/M2 | OXYGEN SATURATION: 100 % | HEIGHT: 60 IN | SYSTOLIC BLOOD PRESSURE: 131 MMHG | RESPIRATION RATE: 14 BRPM | DIASTOLIC BLOOD PRESSURE: 65 MMHG

## 2023-05-10 DIAGNOSIS — M62.838 CERVICAL PARASPINAL MUSCLE SPASM: Primary | ICD-10-CM

## 2023-05-10 LAB
ANION GAP SERPL CALC-SCNC: 6 MMOL/L (ref 3–18)
APPEARANCE UR: ABNORMAL
BACTERIA URNS QL MICRO: ABNORMAL /HPF
BASOPHILS # BLD: 0 K/UL (ref 0–0.1)
BASOPHILS NFR BLD: 0 % (ref 0–2)
BILIRUB UR QL: NEGATIVE
BUN SERPL-MCNC: 6 MG/DL (ref 7–18)
BUN/CREAT SERPL: 11 (ref 12–20)
CA-I BLD-MCNC: 8.8 MG/DL (ref 8.5–10.1)
CHLORIDE SERPL-SCNC: 109 MMOL/L (ref 100–111)
CO2 SERPL-SCNC: 25 MMOL/L (ref 21–32)
COLOR UR: YELLOW
CREAT SERPL-MCNC: 0.56 MG/DL (ref 0.6–1.3)
DIFFERENTIAL METHOD BLD: ABNORMAL
EOSINOPHIL # BLD: 0.1 K/UL (ref 0–0.4)
EOSINOPHIL NFR BLD: 1 % (ref 0–5)
EPITH CASTS URNS QL MICRO: ABNORMAL /LPF (ref 0–20)
ERYTHROCYTE [DISTWIDTH] IN BLOOD BY AUTOMATED COUNT: 12.5 % (ref 11.6–14.5)
GLUCOSE SERPL-MCNC: 104 MG/DL (ref 74–99)
GLUCOSE UR STRIP.AUTO-MCNC: NEGATIVE MG/DL
HCG UR QL: NEGATIVE
HCT VFR BLD AUTO: 43.4 % (ref 35–45)
HGB BLD-MCNC: 14.8 G/DL (ref 12–16)
HGB UR QL STRIP: NEGATIVE
IMM GRANULOCYTES # BLD AUTO: 0.1 K/UL (ref 0–0.04)
IMM GRANULOCYTES NFR BLD AUTO: 1 % (ref 0–0.5)
KETONES UR QL STRIP.AUTO: NEGATIVE MG/DL
LEUKOCYTE ESTERASE UR QL STRIP.AUTO: ABNORMAL
LYMPHOCYTES # BLD: 2.5 K/UL (ref 0.9–3.6)
LYMPHOCYTES NFR BLD: 25 % (ref 21–52)
MAGNESIUM SERPL-MCNC: 2.1 MG/DL (ref 1.6–2.6)
MCH RBC QN AUTO: 30.6 PG (ref 24–34)
MCHC RBC AUTO-ENTMCNC: 34.1 G/DL (ref 31–37)
MCV RBC AUTO: 89.9 FL (ref 78–100)
MONOCYTES # BLD: 0.7 K/UL (ref 0.05–1.2)
MONOCYTES NFR BLD: 6 % (ref 3–10)
NEUTS SEG # BLD: 6.8 K/UL (ref 1.8–8)
NEUTS SEG NFR BLD: 67 % (ref 40–73)
NITRITE UR QL STRIP.AUTO: NEGATIVE
NRBC # BLD: 0 K/UL (ref 0–0.01)
NRBC BLD-RTO: 0 PER 100 WBC
PH UR STRIP: 7 (ref 5–8)
PLATELET # BLD AUTO: 294 K/UL (ref 135–420)
PMV BLD AUTO: 9.8 FL (ref 9.2–11.8)
POTASSIUM SERPL-SCNC: 3.6 MMOL/L (ref 3.5–5.5)
PROT UR STRIP-MCNC: NEGATIVE MG/DL
RBC # BLD AUTO: 4.83 M/UL (ref 4.2–5.3)
RBC #/AREA URNS HPF: ABNORMAL /HPF (ref 0–2)
SODIUM SERPL-SCNC: 140 MMOL/L (ref 136–145)
SP GR UR REFRACTOMETRY: 1.01 (ref 1–1.03)
UROBILINOGEN UR QL STRIP.AUTO: 0.2 EU/DL (ref 0.2–1)
WBC # BLD AUTO: 10.2 K/UL (ref 4.6–13.2)
WBC URNS QL MICRO: ABNORMAL /HPF (ref 0–4)

## 2023-05-10 PROCEDURE — 99284 EMERGENCY DEPT VISIT MOD MDM: CPT

## 2023-05-10 PROCEDURE — 83735 ASSAY OF MAGNESIUM: CPT

## 2023-05-10 PROCEDURE — 85025 COMPLETE CBC W/AUTO DIFF WBC: CPT

## 2023-05-10 PROCEDURE — 81025 URINE PREGNANCY TEST: CPT

## 2023-05-10 PROCEDURE — 80048 BASIC METABOLIC PNL TOTAL CA: CPT

## 2023-05-10 PROCEDURE — 81001 URINALYSIS AUTO W/SCOPE: CPT

## 2023-05-10 PROCEDURE — 70450 CT HEAD/BRAIN W/O DYE: CPT

## 2023-05-10 PROCEDURE — 72125 CT NECK SPINE W/O DYE: CPT

## 2023-05-10 RX ORDER — TIZANIDINE 4 MG/1
4 TABLET ORAL EVERY 8 HOURS PRN
Qty: 15 TABLET | Refills: 0 | Status: SHIPPED | OUTPATIENT
Start: 2023-05-10

## 2023-05-10 RX ORDER — IBUPROFEN 800 MG/1
800 TABLET ORAL EVERY 8 HOURS PRN
Qty: 30 TABLET | Refills: 0 | Status: SHIPPED | OUTPATIENT
Start: 2023-05-10

## 2023-05-11 ASSESSMENT — ENCOUNTER SYMPTOMS
SINUS PAIN: 0
SHORTNESS OF BREATH: 0
BACK PAIN: 1
SINUS PRESSURE: 0
RHINORRHEA: 0
ABDOMINAL PAIN: 0

## 2023-05-11 NOTE — ED PROVIDER NOTES
EMERGENCY DEPARTMENT HISTORY AND PHYSICAL EXAM      Patient Name: Beverly Venegas  MRN: [id number]  YOB: 2000  Provider: Hussein De Leon MD  PCP: None None     History of Presenting Illness     Chief Complaint   Patient presents with    Other     \" My pinkies are numb\". History Provided By: Patient     HPI: Beverly Venegas, 23 y.o. female  presents to the ED with cc of bilateral hand pain, neck pain, back pain, headaches. Patient was seen in the emergency department about 1 week ago with similar complaints and was started on Levaquin for possible sinusitis. At that visit she would refuse labs and imaging. She was offered a lumbar puncture and also refused. Patient denies any fevers. She states she continues to get headaches. She has pain from neck to her back. A new complaint today is that her fifth digits on both hands are numb and also has some numbness to the fourth digit of both hands. Past History     Past Medical History:  Past Medical History:   Diagnosis Date    Depression        Past Surgical History:  Past Surgical History:   Procedure Laterality Date    GYN         Medications:  No current facility-administered medications for this encounter. Current Outpatient Medications   Medication Sig Dispense Refill    ibuprofen (ADVIL;MOTRIN) 800 MG tablet Take 1 tablet by mouth every 8 hours as needed for Pain 30 tablet 0    tiZANidine (ZANAFLEX) 4 MG tablet Take 1 tablet by mouth every 8 hours as needed (muscle pain/spasms) 15 tablet 0    Prenatal MV-Min-Fe Fum-FA-DHA (PRENATAL 1 PO) Take by mouth         Social History:  Social History     Tobacco Use    Smoking status: Former     Packs/day: 0.25     Types: Cigarettes    Smokeless tobacco: Current   Substance Use Topics    Alcohol use: Not Currently    Drug use: Not Currently       Allergies:  No Known Allergies    All the above components of the past  history are auto-populated from the electronic record.   They have been